# Patient Record
(demographics unavailable — no encounter records)

---

## 2025-02-10 NOTE — HISTORY OF PRESENT ILLNESS
[FreeTextEntry1] : R hip pain  [de-identified] : Pt is a 77-year-old female with PMH of HTN, HLD, prediabetes, CAD, hypothyroidism, and significant FMHx of fatal MI (6 siblings) who presents for follow up. Pt presents with complaints of persistent R hip pain for several months and has been ongoing since her last visit in 7/2024. She was prescribed gabapentin 300mg qhs, tylenol 1g q8 PRN, lidocaine patches, and PT referral. At her PT appointment, she was told that her pain is likely due to bursitis and was given home exercises to do which she reports has not helped. She also reports generalized weakness, weight gain and that "my clothes are not fitting well anymore," bilateral UE pain, and a swelling in her medial upper R thigh. She reports she is compliant with all her medications, including levothyroxine. She denies any fevers, chills, myalgias, arthralgias, paresthesia, cold intolerance, recent travel, sick contacts, nausea, vomiting, CP, SOB, cough, abdominal pain, constipation, diarrhea, or LE swelling.  She also requests a prescription for Zepbound for management of her diabetes. She is reluctant to start meformin as she has had family members (mother, multiple sisters) that were diagnosed with breast cancer soon after starting metformin. She reports her last mammogram was in 2023 and was normal. She denies any prior hx of any breast masses, pain, or discharge.

## 2025-02-10 NOTE — END OF VISIT
[] : Resident [FreeTextEntry3] : 77F w/HTN, HLD, predm, cad, hypothyroidism here for Hip pain, ongoing, currently enrolled in PT. Recommend Ortho eval for possible injection and/or additional imaging. reviewed xray from 2024.  Also reports hx of breast CA in multiple sisters, per nadine model elevated risk than average population, will continue annual mammograms.

## 2025-02-10 NOTE — REVIEW OF SYSTEMS
[Muscle Weakness] : muscle weakness [Fever] : no fever [Chills] : no chills [Fatigue] : no fatigue [Hot Flashes] : no hot flashes [Discharge] : no discharge [Pain] : no pain [Vision Problems] : no vision problems [Earache] : no earache [Sore Throat] : no sore throat [Chest Pain] : no chest pain [Palpitations] : no palpitations [Lower Ext Edema] : no lower extremity edema [Shortness Of Breath] : no shortness of breath [Wheezing] : no wheezing [Cough] : no cough [Abdominal Pain] : no abdominal pain [Nausea] : no nausea [Constipation] : no constipation [Diarrhea] : diarrhea [Vomiting] : no vomiting [Dysuria] : no dysuria [Hematuria] : no hematuria [Frequency] : no frequency [Joint Pain] : no joint pain [Joint Swelling] : no joint swelling [Muscle Pain] : no muscle pain [Back Pain] : no back pain [Itching] : no itching [Skin Rash] : no skin rash [Headache] : no headache [Dizziness] : no dizziness

## 2025-02-10 NOTE — PHYSICAL EXAM
[No Acute Distress] : no acute distress [Well Nourished] : well nourished [Well Developed] : well developed [Well-Appearing] : well-appearing [Normal Sclera/Conjunctiva] : normal sclera/conjunctiva [EOMI] : extraocular movements intact [Normal Outer Ear/Nose] : the outer ears and nose were normal in appearance [No Lymphadenopathy] : no lymphadenopathy [Supple] : supple [No Respiratory Distress] : no respiratory distress  [No Accessory Muscle Use] : no accessory muscle use [Clear to Auscultation] : lungs were clear to auscultation bilaterally [Normal Rate] : normal rate  [Regular Rhythm] : with a regular rhythm [Normal S1, S2] : normal S1 and S2 [No Murmur] : no murmur heard [No Edema] : there was no peripheral edema [No Extremity Clubbing/Cyanosis] : no extremity clubbing/cyanosis [Soft] : abdomen soft [Non Tender] : non-tender [Non-distended] : non-distended [No Masses] : no abdominal mass palpated [Normal Bowel Sounds] : normal bowel sounds [Normal Posterior Cervical Nodes] : no posterior cervical lymphadenopathy [Normal Anterior Cervical Nodes] : no anterior cervical lymphadenopathy [No Joint Swelling] : no joint swelling [Grossly Normal Strength/Tone] : grossly normal strength/tone [No Rash] : no rash [Coordination Grossly Intact] : coordination grossly intact [No Focal Deficits] : no focal deficits [Normal Affect] : the affect was normal [Normal Insight/Judgement] : insight and judgment were intact [de-identified] : Mild TTP of the R hip, palpable 2 cm superficial mass in the R medial upper thigh. No TTP of the bilateral shoulders, wrists, PIP, DIP, knees, ankles.  [de-identified] : Mild antalgic gait

## 2025-02-10 NOTE — ASSESSMENT
[FreeTextEntry1] : Pt is a 77-year-old female with PMH of HTN, HLD, prediabetes, CAD, hypothyroidism, and significant FMHx of fatal MI (6 siblings) who presents for follow up for R hip pain.  #R hip pain #Generalized weakness Persistent pain despite conservative management with PO gabapentin 300mg qhs, tylenol 1g q8 PRN, lidocaine patches, PT 2/2024 R pelvic XR w/ mild degenerative changes of the hip, reports previously normal DEXA scan [id number] 7/2024 Vitamin D 46.5 ng/nL Pt now with generalized weakness, bilateral LE pain, and subjective weight gain - TSH today - orthopedic referral given for further evaluation - prescription for gabapentin 200mg qhs sent -- pt reporting increased lethargy on 300mg qhs - diclofenac 1% topic gel q8 PRN prescription sent - follow up in 4-6 weeks, recommend scheduling sooner if needed  #DM A1C 6.9 (7/2024)  Pt interested in trialing Zepbound - repeat A1C today - recommend continuing low carb diet and regular exercise - will discuss diabetic management at next visit pending repeat A1C  #R thigh mass Palpable 2 cm superficial mass in the medial, upper thigh Non-tender to palpation -- likely lipoma vs superficial inguinal lymph node - ctm for any changes including size, texture, or tenderness  #Healthcare maintenance Significant FHx of breast cancer (mother, multiple sisters) Last mammogram in 2023 wnl, no prior hx of masses, breast pain, or nipple discharge Elisha model: 1.7% 5-year risk (vs 0.8% average risk), 3% lifetime risk (vs 1.4% average risk) Reportedly UTD w/ flu, RSV, shingles, and pneumococcal vaccines  - mammogram referral given at this time given elevated risk - CBC, CMP today - follow up in 4-6 weeks

## 2025-02-25 NOTE — END OF VISIT
[FreeTextEntry3] : Documented by Zamzam Perales acting as a scribe for Dr. Enrique Kam. 02/21/2025   All medical record entries made by the Scribe were at my, Dr. Enrique Kam, direction and personally dictated by me on 02/21/2025. I have reviewed the chart and agree that the record accurately reflects my personal performance of the history, physical exam, assessment and plan. I have also personally directed, reviewed, and agreed with the chart.

## 2025-02-25 NOTE — PHYSICAL EXAM
[de-identified] : General appearance: well nourished and hydrated, pleasant, alert and oriented x 3, cooperative.   HEENT: normocephalic, EOM intact, wearing mask, external auditory canal clear.   Cardiovascular: no lower leg edema, no varicosities, dorsalis pedis pulses palpable and symmetric.   Lymphatics: no palpable lymphadenopathy, no lymphedema.   Neurologic: sensation is normal, no muscle weakness in upper or lower extremities, patella tendon reflexes present and symmetric.   Dermatologic: skin moist, warm, no rash.   Spine: cervical spine with normal lordosis and painless range of motion, thoracic spine with normal kyphosis and painless range of motion, lumbosacral spine with normal lordosis and painless range of motion.  No tenderness to palpation along midline spine and paraspinal musculature.  Sacroiliac joints nontender bilaterally. Negative SLR and crossed SLR tests bilaterally. Gait: She is slow and cautious to stand and get started from a seated position, and she demonstrates right-sided caution.  Limb lengths: clinically, right lower extremity approximately 3 mm shorter than left.  Right hip: - Focal soft tissue swelling: none - Ecchymosis: none - Erythema: none - Wounds: none - Tenderness: She is minimally tender to palpation over the greater trochanter and she is non tender to palpation over the groin.  - ROM:    - Flexion: 100   - Extension: 0   - Adduction: 10   - Abduction: 25   - Internal rotation in 90 degrees of hip flexion: 5   - External rotation in 90 degrees of hip flexion: 15 - JASMINE: positive - FADIR: positive - Dilan: negative - Stinchfield: positive - Flexor power: 4/5 - Abductor power: 4/5   [de-identified] : A lateral view of the lumbosacral spine, weightbearing AP pelvis, and 2 additional views (frog lateral and false profile) of the (left/right) hip were interpreted by me and reviewed with the patient.  Location of imaging:  [facility] Date of exam: 02/21/2025  Lumbar spine -- Alignment: loss of normal lordosis. Spondylosis: moderate multilevel spondylosis. Listhesis: none Posterior elements: mild to moderate multilevel facet arthrosis.  Pelvic alignment: normal  Right hip -- Arthritis: moderate to severe osteoarthritis with predominantly superior joint-space narrowing TONNIS 2-3.  Deformity: none Osteonecrosis: none  Left hip -- Arthritis: none Deformity: none Osteonecrosis: none

## 2025-02-25 NOTE — DISCUSSION/SUMMARY
[de-identified] : IMP:77-year-old female with right hip osteoarthritis. - She is a candidate for right total hip replacement and would stand to benefit a great deal from it, but she had no interest in discussing any surgery today, and so instead we will begin with non-surgical treatment, including physical therapy, home exercise program, Tylenol and Celebrex as needed.  - She may follow up in two months with no new x-rays needed to review clinical progress.  - If she has not had adequate relief from conservative management at that time, then we may need to have a little further discussion regarding hip replacement.

## 2025-02-25 NOTE — PHYSICAL EXAM
[de-identified] : General appearance: well nourished and hydrated, pleasant, alert and oriented x 3, cooperative.   HEENT: normocephalic, EOM intact, wearing mask, external auditory canal clear.   Cardiovascular: no lower leg edema, no varicosities, dorsalis pedis pulses palpable and symmetric.   Lymphatics: no palpable lymphadenopathy, no lymphedema.   Neurologic: sensation is normal, no muscle weakness in upper or lower extremities, patella tendon reflexes present and symmetric.   Dermatologic: skin moist, warm, no rash.   Spine: cervical spine with normal lordosis and painless range of motion, thoracic spine with normal kyphosis and painless range of motion, lumbosacral spine with normal lordosis and painless range of motion.  No tenderness to palpation along midline spine and paraspinal musculature.  Sacroiliac joints nontender bilaterally. Negative SLR and crossed SLR tests bilaterally. Gait: She is slow and cautious to stand and get started from a seated position, and she demonstrates right-sided caution.  Limb lengths: clinically, right lower extremity approximately 3 mm shorter than left.  Right hip: - Focal soft tissue swelling: none - Ecchymosis: none - Erythema: none - Wounds: none - Tenderness: She is minimally tender to palpation over the greater trochanter and she is non tender to palpation over the groin.  - ROM:    - Flexion: 100   - Extension: 0   - Adduction: 10   - Abduction: 25   - Internal rotation in 90 degrees of hip flexion: 5   - External rotation in 90 degrees of hip flexion: 15 - JASMINE: positive - FADIR: positive - Dilan: negative - Stinchfield: positive - Flexor power: 4/5 - Abductor power: 4/5   [de-identified] : A lateral view of the lumbosacral spine, weightbearing AP pelvis, and 2 additional views (frog lateral and false profile) of the (left/right) hip were interpreted by me and reviewed with the patient.  Location of imaging:  [facility] Date of exam: 02/21/2025  Lumbar spine -- Alignment: loss of normal lordosis. Spondylosis: moderate multilevel spondylosis. Listhesis: none Posterior elements: mild to moderate multilevel facet arthrosis.  Pelvic alignment: normal  Right hip -- Arthritis: moderate to severe osteoarthritis with predominantly superior joint-space narrowing TONNIS 2-3.  Deformity: none Osteonecrosis: none  Left hip -- Arthritis: none Deformity: none Osteonecrosis: none

## 2025-02-25 NOTE — HISTORY OF PRESENT ILLNESS
[Constant] : ~He/She~ states the symptoms seem to be constant [Bending] : worsened by bending [Walking] : worsened by walking [Acetaminophen] : relieved by acetaminophen [Pain Location] : pain [] : right hip [5] : a current pain level of 5/10 [de-identified] : 02/21/2025:77-year-old female presenting her first evaluation of right hip pain, she localizes the pain to the right groin and the right buttock. The symptoms have  been present for over a year. Treatment up until this point has included Tylenol and gabapentin. The gabapentin she does not particularly like because she finds it  very sedating and she does not want to become dependent upon it. She has never participated in physical therapy, but she did see a physical therapist who  provided her with some home exercises that she's been doing her best to follow. She reports that her ambulatory tolerance at this point is approximately one to  two blocks without the use of an assistive device.  PMHx: is significant for high blood pressure, high cholesterol, diabetes, and hypothyroidism. She has no allergies, she has no toxic habits.

## 2025-02-25 NOTE — HISTORY OF PRESENT ILLNESS
[Constant] : ~He/She~ states the symptoms seem to be constant [Bending] : worsened by bending [Walking] : worsened by walking [Acetaminophen] : relieved by acetaminophen [Pain Location] : pain [] : right hip [5] : a current pain level of 5/10 [de-identified] : 02/21/2025:77-year-old female presenting her first evaluation of right hip pain, she localizes the pain to the right groin and the right buttock. The symptoms have  been present for over a year. Treatment up until this point has included Tylenol and gabapentin. The gabapentin she does not particularly like because she finds it  very sedating and she does not want to become dependent upon it. She has never participated in physical therapy, but she did see a physical therapist who  provided her with some home exercises that she's been doing her best to follow. She reports that her ambulatory tolerance at this point is approximately one to  two blocks without the use of an assistive device.  PMHx: is significant for high blood pressure, high cholesterol, diabetes, and hypothyroidism. She has no allergies, she has no toxic habits.

## 2025-02-25 NOTE — DISCUSSION/SUMMARY
[de-identified] : IMP:77-year-old female with right hip osteoarthritis. - She is a candidate for right total hip replacement and would stand to benefit a great deal from it, but she had no interest in discussing any surgery today, and so instead we will begin with non-surgical treatment, including physical therapy, home exercise program, Tylenol and Celebrex as needed.  - She may follow up in two months with no new x-rays needed to review clinical progress.  - If she has not had adequate relief from conservative management at that time, then we may need to have a little further discussion regarding hip replacement.

## 2025-03-14 NOTE — PHYSICAL EXAM
[No Acute Distress] : no acute distress [Normal Sclera/Conjunctiva] : normal sclera/conjunctiva [Normal Outer Ear/Nose] : the outer ears and nose were normal in appearance [Clear to Auscultation] : lungs were clear to auscultation bilaterally [Normal Rate] : normal rate  [Regular Rhythm] : with a regular rhythm [Normal S1, S2] : normal S1 and S2 [Pedal Pulses Present] : the pedal pulses are present [No Edema] : there was no peripheral edema [Soft] : abdomen soft [Non Tender] : non-tender [Normal Bowel Sounds] : normal bowel sounds [No Focal Deficits] : no focal deficits [Alert and Oriented x3] : oriented to person, place, and time

## 2025-03-14 NOTE — END OF VISIT
[] : Resident [FreeTextEntry3] : Pt seen and examined with Dr. Rogel, doing well, will plan to stop levothyroxine and repeat TSH (with lipids) to see if replacement is needed.

## 2025-03-14 NOTE — HISTORY OF PRESENT ILLNESS
[FreeTextEntry1] : Follow up visit [de-identified] : Ms. Ansari is a 77 year old female with a past medical history of PMH of HTN, HLD, prediabetes, CAD, hypothyroidism, and significant FMHx of fatal MI (6 siblings) who presents for follow up for HTN.   Today the patient has well controlled blood pressure, reports that her right hip pain is under control at this time. No further episodes of chest pain since last April, Stress echo at that time was normal. She continues to monitor her home glucose and with dietary adjustment.   She is skeptical at this time about her diagnosis of hypothyroidism and is interested in discontinuing her Synthroid medication.

## 2025-03-14 NOTE — HISTORY OF PRESENT ILLNESS
[FreeTextEntry1] : Follow up visit [de-identified] : Ms. Ansari is a 77 year old female with a past medical history of PMH of HTN, HLD, prediabetes, CAD, hypothyroidism, and significant FMHx of fatal MI (6 siblings) who presents for follow up for HTN.   Today the patient has well controlled blood pressure, reports that her right hip pain is under control at this time. No further episodes of chest pain since last April, Stress echo at that time was normal. She continues to monitor her home glucose and with dietary adjustment.   She is skeptical at this time about her diagnosis of hypothyroidism and is interested in discontinuing her Synthroid medication.

## 2025-03-14 NOTE — HEALTH RISK ASSESSMENT
[Little interest or pleasure doing things] : 1) Little interest or pleasure doing things [Feeling down, depressed, or hopeless] : 2) Feeling down, depressed, or hopeless [0] : 2) Feeling down, depressed, or hopeless: Not at all (0) [PHQ-9 Negative - No further assessment needed] : PHQ-9 Negative - No further assessment needed [Never] : Never [[id number]] : 0

## 2025-03-14 NOTE — HEALTH RISK ASSESSMENT
[Little interest or pleasure doing things] : 1) Little interest or pleasure doing things [Feeling down, depressed, or hopeless] : 2) Feeling down, depressed, or hopeless [0] : 2) Feeling down, depressed, or hopeless: Not at all (0) [PHQ-9 Negative - No further assessment needed] : PHQ-9 Negative - No further assessment needed [Never] : Never [[id number]] : 0

## 2025-03-14 NOTE — REVIEW OF SYSTEMS
[Joint Pain] : joint pain [Fever] : no fever [Chills] : no chills [Recent Change In Weight] : ~T no recent weight change [Vision Problems] : no vision problems [Nasal Discharge] : no nasal discharge [Chest Pain] : no chest pain [Palpitations] : no palpitations [Lower Ext Edema] : no lower extremity edema [Shortness Of Breath] : no shortness of breath [Wheezing] : no wheezing [Dyspnea on Exertion] : no dyspnea on exertion [Nausea] : no nausea [Vomiting] : no vomiting [Heartburn] : no heartburn [Dysuria] : no dysuria [Incontinence] : no incontinence [Frequency] : no frequency

## 2025-04-21 NOTE — END OF VISIT
Called pt back, no answer left message    [] : Resident [FreeTextEntry3] : Pt seen and examined with Dr. Rogel, doing well, will plan to stop levothyroxine and repeat TSH (with lipids) to see if replacement is needed.

## 2025-04-25 NOTE — HISTORY OF PRESENT ILLNESS
[de-identified] : 04/25/2025: 77-year-old female who presents with right hip osteoarthritis. Patient reports that since the last time we saw her, she has continued to have very  similar symptoms. She's been taking Tylenol and Gabapentin, which both have helped her to a degree. Still, she has not been able to do the physical therapy due  to insurance issues which are currently being worked out now. She has been doing a home exercise program.  02/21/2025:77-year-old female presenting her first evaluation of right hip pain, she localizes the pain to the right groin and the right buttock. The symptoms have  been present for over a year. Treatment up until this point has included Tylenol and gabapentin. The gabapentin she does not particularly like because she finds it  very sedating and she does not want to become dependent upon it. She has never participated in physical therapy, but she did see a physical therapist who provided her with some home exercises that she's been doing her best to follow. She reports that her ambulatory tolerance at this point is approximately one to two blocks without the use of an assistive device. PMHx: is significant for high blood pressure, high cholesterol, diabetes, and hypothyroidism. She has no allergies, she has no toxic habits.       MING MCKINNEY presents with pain of the right hip. Pain levels include a current pain level of 5/10.  She states the symptoms seem to be constant.    Modifying factors - worsened by bending and worsened by walking. Relieving factors include relieved by acetaminophen.

## 2025-04-25 NOTE — DISCUSSION/SUMMARY
[de-identified] : IMP: 77 year old female with advanced right hip osteoarthritis. - She remains uninterested in discussing any surgical options at this point and wishes to re-attempt physical therapy.  - She does not appear to have faced an insurance denial upon further discussion with her, she simply seems to have selected a physical therapist that is not in network with her insurance.  - So I provided her with multiple other referrals to physical therapists who I am 100% sure are in network with [facility], and I have furthermore directed her to have further discussion with [facility] to find other participating providers and network. -  Home exercise program was provided for her again, she may continue using Tylenol and Gabapentin as needed.  - She'll follow up here as needed.  - If symptoms fail to respond to ongoing treatment, then we should have further discussion regarding hip replacement surgery.

## 2025-04-25 NOTE — PHYSICAL EXAM
[de-identified] : General appearance: well nourished and hydrated, pleasant, alert and oriented x 3, cooperative. HEENT: normocephalic, EOM intact, wearing mask, external auditory canal clear. Cardiovascular: no lower leg edema, no varicosities, dorsalis pedis pulses palpable and symmetric. Lymphatics: no palpable lymphadenopathy, no lymphedema. Neurologic: sensation is normal, no muscle weakness in upper or lower extremities, patella tendon reflexes present and symmetric. Dermatologic: skin moist, warm, no rash. Spine: cervical spine with normal lordosis and painless range of motion, thoracic spine with normal kyphosis and painless range of motion, lumbosacral spine with normal lordosis and painless range of motion. No tenderness to palpation along midline spine and paraspinal musculature. Sacroiliac joints nontender bilaterally. Negative SLR and crossed SLR tests bilaterally. Gait: She is slow and cautious to stand and get started from a seated position, and she demonstrates right-sided caution.  Limb lengths: clinically, right lower extremity approximately 3 mm shorter than left.  Right hip: - Focal soft tissue swelling: none - Ecchymosis: none - Erythema: none - Wounds: none - Tenderness: She is minimally tender to palpation over the greater trochanter and she is non tender to palpation over the groin. - ROM: - Flexion: 100 - Extension: 0 - Adduction: 10 - Abduction: 25 - Internal rotation in 90 degrees of hip flexion: 5 - External rotation in 90 degrees of hip flexion: 15 - JASMINE: positive - FADIR: positive - Dilan: negative - Stinchfield: positive - Flexor power: 4/5 - Abductor power: 4/5

## 2025-04-25 NOTE — END OF VISIT
[FreeTextEntry3] : Documented by Zamzam Perales acting as a scribe for Dr. Enrique Kam. 04/25/2025  All medical record entries made by the Scribe were at my, Dr. Enrique Kam, direction and personally dictated by me on 04/25/2025. I have reviewed the chart and agree that the record accurately reflects my personal performance of the history, physical exam, assessment and plan. I have also personally directed, reviewed, and agreed with the chart.

## 2025-06-13 NOTE — PHYSICAL EXAM
[Normal] : no rash [de-identified] : R leg 4/5 strength with hip and knee exten/flex. L leg 3/5 strength with hip and knee exten/flex, UE 4/5 bilaterally. sensation intact in UE and LE

## 2025-06-13 NOTE — REVIEW OF SYSTEMS
[Joint Pain] : joint pain [Negative] : Respiratory [FreeTextEntry9] : R hip joint pain and assocaited L leg weakness [de-identified] : left leg weakness

## 2025-06-13 NOTE — HISTORY OF PRESENT ILLNESS
[FreeTextEntry1] : pt is here for follow up also has pain on her right side. [de-identified] : Pt is a 78 y/o F with PMH of HTN, HLD, pre-DM, CAD, hypothyroid, presenting with R hip pain and associated L leg weakness. Pt reports that for the past year, she has had R hip pain.Saw ortho in april and was told that she has severe OA. However, uninterested in surgery. She has tried gabapentin and tylenol. Only relief with advil. Acutely, pain got worse but does not endorse any physical injury to the area. She went to [facility] ED and got CTAP with oral and IV contrast which showed generative changes to L5-S1. At this visit, complaining of R hip/buttock pain and L leg weakness. No loss of sensation.

## 2025-06-13 NOTE — END OF VISIT
[] : Resident [FreeTextEntry3] : 77F wHTN, HLD, T2DM, CAD, hypothyroidism, R hip OA here today for f/u on R hip pain and LLE weakness. R hip pain 2/2 OA - eval by ortho, recommended for possible surgical intervention however pt declines at this time. Has not been able to find a PT who takes her insurance, provided referral today. Recommend against Celebrex d/t hx of gastric ulcer, gabapentin made her drowsy. Standing Tylenol 1g q8, Lidocaine patches, Diclofenec gel and PT - will refer to PMR. Suspect LLE weakness d/t muscle fatigue as pt relying heavily on L leg for ambulation.